# Patient Record
Sex: FEMALE | Race: WHITE | URBAN - METROPOLITAN AREA
[De-identification: names, ages, dates, MRNs, and addresses within clinical notes are randomized per-mention and may not be internally consistent; named-entity substitution may affect disease eponyms.]

---

## 2022-03-30 ENCOUNTER — OFFICE VISIT (OUTPATIENT)
Dept: URBAN - METROPOLITAN AREA CLINIC 24 | Facility: CLINIC | Age: 82
End: 2022-03-30
Payer: COMMERCIAL

## 2022-03-30 DIAGNOSIS — H16.143 PUNCTATE KERATITIS, BILATERAL: ICD-10-CM

## 2022-03-30 DIAGNOSIS — H26.491 OTHER SECONDARY CATARACT, RIGHT EYE: ICD-10-CM

## 2022-03-30 DIAGNOSIS — H40.013 OPEN ANGLE WITH BORDERLINE FINDINGS, LOW RISK, BILATERAL: Primary | ICD-10-CM

## 2022-03-30 DIAGNOSIS — H52.13 MYOPIA, BILATERAL: ICD-10-CM

## 2022-03-30 PROCEDURE — 92083 EXTENDED VISUAL FIELD XM: CPT | Performed by: OPTOMETRIST

## 2022-03-30 PROCEDURE — 76514 ECHO EXAM OF EYE THICKNESS: CPT | Performed by: OPTOMETRIST

## 2022-03-30 PROCEDURE — 92133 CPTRZD OPH DX IMG PST SGM ON: CPT | Performed by: OPTOMETRIST

## 2022-03-30 PROCEDURE — 99204 OFFICE O/P NEW MOD 45 MIN: CPT | Performed by: OPTOMETRIST

## 2022-03-30 ASSESSMENT — INTRAOCULAR PRESSURE
OS: 16
OD: 16

## 2022-03-30 ASSESSMENT — KERATOMETRY
OD: 45.72
OS: 45.67

## 2022-03-30 ASSESSMENT — VISUAL ACUITY: OD: 20/20

## 2022-03-30 NOTE — IMPRESSION/PLAN
Impression: Punctate keratitis, bilateral: H16.143. Plan: Continue w/ consistent use of afts. Rec. olopatadine or ketotifen prn for sac.

## 2022-03-30 NOTE — IMPRESSION/PLAN
Impression: Open angle with borderline findings, low risk, bilateral: H40.013.
-- per history; informed of suspicion by ECP in [place], CDR disparity
--Tmax OD:16, OS:16 
--Gonio OD: , OS:
--Pachs OD: 579, OS: 582
--(-) fohx glc 
--(-) sulfa allergy
--(+) pmhx lung disease; sleep apnea; avoid b blocker --Target IOP: tbd Plan: Baseline rnfl oct (no thinning), pachs (avg), HVF 24-2 (OD: full, OS: unreliable / probable lid defect), normal IOP Pt edu. Will continue to monitor suspicion w/o pharmacologic tx. Stressed importance of continued observation. Pt advised future tx may be indicated.